# Patient Record
Sex: FEMALE | Race: WHITE | NOT HISPANIC OR LATINO | Employment: FULL TIME | URBAN - METROPOLITAN AREA
[De-identification: names, ages, dates, MRNs, and addresses within clinical notes are randomized per-mention and may not be internally consistent; named-entity substitution may affect disease eponyms.]

---

## 2023-03-11 PROBLEM — F41.9 ANXIETY: Status: ACTIVE | Noted: 2022-10-11

## 2023-03-11 PROBLEM — L40.9 PSORIASIS: Status: ACTIVE | Noted: 2018-10-31

## 2023-03-11 PROBLEM — N94.6 DYSMENORRHEA: Status: ACTIVE | Noted: 2020-07-22

## 2023-03-11 PROBLEM — L70.9 ACNE: Status: ACTIVE | Noted: 2023-03-11

## 2023-03-11 RX ORDER — PAROXETINE HYDROCHLORIDE 20 MG/1
20 TABLET, FILM COATED ORAL
COMMUNITY
End: 2023-11-27 | Stop reason: ALTCHOICE

## 2023-03-11 RX ORDER — IMIQUIMOD 12.5 MG/.25G
CREAM TOPICAL
COMMUNITY
End: 2023-11-27 | Stop reason: ALTCHOICE

## 2023-03-11 RX ORDER — NORETHINDRONE ACETATE AND ETHINYL ESTRADIOL .02; 1 MG/1; MG/1
1 TABLET ORAL DAILY
COMMUNITY
End: 2023-09-09

## 2023-03-11 RX ORDER — ADAPALENE AND BENZOYL PEROXIDE GEL, 0.1%/2.5% 1; 25 MG/G; MG/G
GEL TOPICAL
COMMUNITY
End: 2023-11-27 | Stop reason: ALTCHOICE

## 2023-03-21 VITALS
DIASTOLIC BLOOD PRESSURE: 66 MMHG | WEIGHT: 126 LBS | HEART RATE: 63 BPM | SYSTOLIC BLOOD PRESSURE: 112 MMHG | BODY MASS INDEX: 21.51 KG/M2 | HEIGHT: 64 IN

## 2023-03-21 PROBLEM — F90.9 ADHD: Status: ACTIVE | Noted: 2023-03-21

## 2023-03-24 ENCOUNTER — APPOINTMENT (OUTPATIENT)
Dept: PEDIATRICS | Facility: CLINIC | Age: 23
End: 2023-03-24
Payer: COMMERCIAL

## 2023-05-24 PROBLEM — L70.9 ACNE: Status: RESOLVED | Noted: 2023-03-11 | Resolved: 2023-05-24

## 2023-05-25 ENCOUNTER — OFFICE VISIT (OUTPATIENT)
Dept: PEDIATRICS | Facility: CLINIC | Age: 23
End: 2023-05-25
Payer: COMMERCIAL

## 2023-05-25 VITALS
DIASTOLIC BLOOD PRESSURE: 80 MMHG | HEIGHT: 62 IN | BODY MASS INDEX: 36.95 KG/M2 | SYSTOLIC BLOOD PRESSURE: 131 MMHG | WEIGHT: 200.8 LBS

## 2023-05-25 DIAGNOSIS — F41.9 ANXIETY: Primary | ICD-10-CM

## 2023-05-25 PROCEDURE — 99213 OFFICE O/P EST LOW 20 MIN: CPT | Performed by: PEDIATRICS

## 2023-05-25 RX ORDER — PAROXETINE 30 MG/1
30 TABLET, FILM COATED ORAL EVERY MORNING
Qty: 30 TABLET | Refills: 0 | Status: SHIPPED | OUTPATIENT
Start: 2023-05-25 | End: 2023-11-16

## 2023-05-25 NOTE — PROGRESS NOTES
"Subjective   Patient ID: Simin Molina is a 22 y.o. female who presents for Med Refill    HPI:   - Anxiety - on Paxil 20mg, felt ok after increasing initially.  But recently, has been rough.  Not really helping, still get anxious feelings.  No SE.  Panic attacks at night, crying at night and then will have a hard time sleeping, happening 1-2 times per week.  Little things bother her.  Little motivation, all she wants to do is lay in bed.  Has BF - have been together for the past 5 years, going well.         Review of Systems   All other systems reviewed and are negative.      Objective   Visit Vitals  /80   Ht 1.581 m (5' 2.25\")   Wt 91.1 kg (200 lb 12.8 oz)   BMI 36.43 kg/m²   Smoking Status Unknown   BSA 2 m²     Physical Exam  Vitals reviewed.   Constitutional:       Appearance: Normal appearance.   HENT:      Head: Normocephalic.      Right Ear: External ear normal.      Left Ear: External ear normal.      Nose: Nose normal.      Mouth/Throat:      Mouth: Mucous membranes are moist.   Pulmonary:      Effort: Pulmonary effort is normal.   Skin:     Findings: No rash.   Neurological:      Mental Status: She is alert.       Assessment/Plan   22 y.o. female here with:   - Anxiety/depression - not currently well controlled on Paxil 20.  Will increase Paxil to 30mg daily for the next month, patient to call in 2-3 weeks with update.  If not doing well, will consider switching meds to Lexapro.      Family understands plan and all questions answered.  Discussed all orders from visit and any results received today.  Call or return to office if worsens.    "

## 2023-06-29 ENCOUNTER — TELEPHONE (OUTPATIENT)
Dept: PEDIATRICS | Facility: CLINIC | Age: 23
End: 2023-06-29
Payer: COMMERCIAL

## 2023-06-29 DIAGNOSIS — F41.9 ANXIETY: Primary | ICD-10-CM

## 2023-06-29 RX ORDER — PAROXETINE 30 MG/1
30 TABLET, FILM COATED ORAL EVERY MORNING
Qty: 30 TABLET | Refills: 2 | Status: SHIPPED | OUTPATIENT
Start: 2023-06-29 | End: 2023-11-27 | Stop reason: ALTCHOICE

## 2023-06-29 NOTE — TELEPHONE ENCOUNTER
Called patient, feeling great on Paxil 30mg, no SE.  Will refill x3 months, C in Sept for follow up.  KW

## 2023-09-09 DIAGNOSIS — N94.6 DYSMENORRHEA: Primary | ICD-10-CM

## 2023-09-09 RX ORDER — ESTAZOLAM 2 MG/1
TABLET ORAL
Qty: 21 TABLET | Refills: 2 | Status: SHIPPED | OUTPATIENT
Start: 2023-09-09 | End: 2023-11-13

## 2023-11-07 DIAGNOSIS — N94.6 DYSMENORRHEA: ICD-10-CM

## 2023-11-13 DIAGNOSIS — N94.6 DYSMENORRHEA: Primary | ICD-10-CM

## 2023-11-13 RX ORDER — ESTAZOLAM 2 MG/1
TABLET ORAL
Qty: 21 TABLET | Refills: 0 | Status: SHIPPED | OUTPATIENT
Start: 2023-11-13 | End: 2023-11-13 | Stop reason: SDUPTHER

## 2023-11-13 RX ORDER — NORETHINDRONE ACETATE AND ETHINYL ESTRADIOL .02; 1 MG/1; MG/1
TABLET ORAL
Qty: 21 TABLET | Refills: 0 | Status: SHIPPED | OUTPATIENT
Start: 2023-11-13 | End: 2023-11-27 | Stop reason: SDUPTHER

## 2023-11-14 DIAGNOSIS — F41.9 ANXIETY: ICD-10-CM

## 2023-11-16 RX ORDER — PAROXETINE 30 MG/1
30 TABLET, FILM COATED ORAL
Qty: 30 TABLET | Refills: 0 | Status: SHIPPED | OUTPATIENT
Start: 2023-11-16 | End: 2023-11-27 | Stop reason: ALTCHOICE

## 2023-11-26 NOTE — PROGRESS NOTES
"Simin Molina is a 23 y.o. female here today for well .    Accompanied by: self    Current issues:    - Anxiety - on Paxil 30, has been working \"pretty well.\"  Gets anxious sometimes, but not as bad as before.  Still gets panic attacks a couple times per month.  Still tearful at times.  Not talking with counselor yet.      Nutrition/Elimination/Sleep:   - Diet: well balanced diet (has gained 19# over the past year) and appropriate dairy intake    - Dental: brushes teeth twice daily and regular dental visits (Dr. Butts)   - Elimination: normal bowel movement frequency and consistency   - Menarche/periods: on OCPs (Microgestin), pap recommended (has OB in mind).     - Sleep: sleeps through the night, no problems with sleep, no snoring, still gets anxious before bed.      School and Behavior screening:   - Employment:  working at [employer], not thinking about going back to college for [employer] at this time.            Screening Questions:  Discussed sexual activity (still with same BF for the past 6 years), living at home, but thinking about moving out with BF, alcohol (seltzers on occ) /smoking/vaping/drug use and safety associated with this.     Mood - denies suicidal ideation    Screen time - encouraged less than 2 hours per day.  Physical activity discussed and encouraged.  Goes to the gym 2-3 days per week, has been going for the past couple of months.        Physical Exam  Visit Vitals  /72   Pulse 80   Ht 1.581 m (5' 2.25\")   Wt 96.1 kg (211 lb 12.8 oz)   BMI 38.43 kg/m²   Smoking Status Unknown   BSA 2.05 m²     Physical Exam  Vitals reviewed. Exam conducted with a chaperone present.   Constitutional:       Appearance: Normal appearance.   HENT:      Head: Normocephalic.      Right Ear: Tympanic membrane normal.      Left Ear: Tympanic membrane normal.      Nose: Nose normal.      Mouth/Throat:      Mouth: Mucous membranes are moist.      Pharynx: Oropharynx is clear.   Eyes:      " Extraocular Movements: Extraocular movements intact.      Conjunctiva/sclera: Conjunctivae normal.   Cardiovascular:      Rate and Rhythm: Normal rate and regular rhythm.      Heart sounds: Normal heart sounds.   Pulmonary:      Effort: Pulmonary effort is normal.      Breath sounds: Normal breath sounds.   Abdominal:      General: Abdomen is flat.      Palpations: Abdomen is soft.   Genitourinary:     Comments: Exam deferred  Musculoskeletal:         General: Normal range of motion.      Cervical back: Normal range of motion and neck supple.   Skin:     General: Skin is warm.   Neurological:      General: No focal deficit present.      Mental Status: She is alert.   Psychiatric:         Mood and Affect: Mood normal.       Assessment/Plan  Healthy 23 y.o. female, G/D well.     - Declining flu and COVID vaccine     - Anxiety - list of counselors given.  Will increase Paxil to 40mg daily for the next month, patient to call/message in 2-3 weeks with update.  If doing well, will refill for another 6 months, and med check in May/June.  List of counselors e-mailed to patient (forgot to give at appt).     - Dysmenorrhea - refill Microgestin- for the next year.     - PHQ-9 - 0   - BMI discussed - cont working on healthy eating habits and regular exercise.     - Return in 1 year for next well child exam or earlier with concerns

## 2023-11-27 ENCOUNTER — OFFICE VISIT (OUTPATIENT)
Dept: PEDIATRICS | Facility: CLINIC | Age: 23
End: 2023-11-27
Payer: COMMERCIAL

## 2023-11-27 VITALS
SYSTOLIC BLOOD PRESSURE: 115 MMHG | BODY MASS INDEX: 38.98 KG/M2 | DIASTOLIC BLOOD PRESSURE: 72 MMHG | HEIGHT: 62 IN | HEART RATE: 80 BPM | WEIGHT: 211.8 LBS

## 2023-11-27 DIAGNOSIS — Z00.00 WELL ADULT EXAM: Primary | ICD-10-CM

## 2023-11-27 DIAGNOSIS — F41.9 ANXIETY: ICD-10-CM

## 2023-11-27 DIAGNOSIS — N94.6 DYSMENORRHEA: ICD-10-CM

## 2023-11-27 PROCEDURE — 99213 OFFICE O/P EST LOW 20 MIN: CPT | Performed by: PEDIATRICS

## 2023-11-27 PROCEDURE — 99395 PREV VISIT EST AGE 18-39: CPT | Performed by: PEDIATRICS

## 2023-11-27 RX ORDER — NORETHINDRONE ACETATE AND ETHINYL ESTRADIOL .02; 1 MG/1; MG/1
TABLET ORAL
Qty: 21 TABLET | Refills: 12 | Status: SHIPPED | OUTPATIENT
Start: 2023-11-27

## 2023-11-27 RX ORDER — PAROXETINE HYDROCHLORIDE 40 MG/1
40 TABLET, FILM COATED ORAL EVERY MORNING
Qty: 30 TABLET | Refills: 0 | Status: SHIPPED | OUTPATIENT
Start: 2023-11-27 | End: 2024-02-01 | Stop reason: SDUPTHER

## 2024-02-01 ENCOUNTER — APPOINTMENT (OUTPATIENT)
Dept: PEDIATRICS | Facility: CLINIC | Age: 24
End: 2024-02-01
Payer: COMMERCIAL

## 2024-02-01 DIAGNOSIS — F41.9 ANXIETY: Primary | ICD-10-CM

## 2024-02-01 RX ORDER — PAROXETINE HYDROCHLORIDE 40 MG/1
40 TABLET, FILM COATED ORAL EVERY MORNING
Qty: 30 TABLET | Refills: 4 | Status: SHIPPED | OUTPATIENT
Start: 2024-02-01

## 2024-02-01 NOTE — PROGRESS NOTES
Subjective   Patient ID: Simin Molina is a 23 y.o. female who presents for No chief complaint on file.    HPI:      Review of Systems   All other systems reviewed and are negative.      Objective   Visit Vitals  Smoking Status Unknown     Physical Exam  Vitals reviewed.   Constitutional:       Appearance: Normal appearance.   HENT:      Head: Normocephalic.      Right Ear: External ear normal.      Left Ear: External ear normal.      Nose: Nose normal.      Mouth/Throat:      Mouth: Mucous membranes are moist.   Pulmonary:      Effort: Pulmonary effort is normal.   Skin:     Findings: No rash.   Neurological:      Mental Status: She is alert.       Assessment/Plan   23 y.o. female here with:      Family understands plan and all questions answered.  Discussed all orders from visit and any results received today.  Call or return to office if worsens.

## 2024-02-01 NOTE — PROGRESS NOTES
Patient called stating Paxil 40 working well.  Will refill for another 5 months.  Med check before running out.  KW

## 2024-04-01 ENCOUNTER — OFFICE VISIT (OUTPATIENT)
Dept: PEDIATRICS | Facility: CLINIC | Age: 24
End: 2024-04-01
Payer: COMMERCIAL

## 2024-04-01 VITALS — TEMPERATURE: 98 F | WEIGHT: 203 LBS | HEART RATE: 71 BPM | BODY MASS INDEX: 36.83 KG/M2 | OXYGEN SATURATION: 98 %

## 2024-04-01 DIAGNOSIS — J18.9 PNEUMONIA DUE TO INFECTIOUS ORGANISM, UNSPECIFIED LATERALITY, UNSPECIFIED PART OF LUNG: Primary | ICD-10-CM

## 2024-04-01 PROCEDURE — 99214 OFFICE O/P EST MOD 30 MIN: CPT | Performed by: PEDIATRICS

## 2024-04-01 RX ORDER — AZITHROMYCIN 250 MG/1
TABLET, FILM COATED ORAL
Qty: 6 TABLET | Refills: 0 | Status: SHIPPED | OUTPATIENT
Start: 2024-04-01 | End: 2024-04-06

## 2024-04-01 RX ORDER — GUAIFENESIN 600 MG/1
600 TABLET, EXTENDED RELEASE ORAL 2 TIMES DAILY
COMMUNITY
Start: 2024-03-25 | End: 2024-06-23

## 2024-04-01 RX ORDER — BROMPHENIRAMINE MALEATE, PSEUDOEPHEDRINE HYDROCHLORIDE, AND DEXTROMETHORPHAN HYDROBROMIDE 2; 30; 10 MG/5ML; MG/5ML; MG/5ML
10 SYRUP ORAL
COMMUNITY
Start: 2024-03-25

## 2024-04-01 RX ORDER — CEFDINIR 300 MG/1
300 CAPSULE ORAL 2 TIMES DAILY
COMMUNITY
Start: 2024-03-25 | End: 2024-04-04

## 2024-04-01 NOTE — PROGRESS NOTES
Subjective   Patient ID: Simin Molina is a 23 y.o. female who presents for Sinusitis (Seen at urgent care dx with sinus infection, no improvement.)    HPI  Sick x 3 weeks  Dx sinusitis 3/25 in urgent care  Cefdinir and guafenecin  Was 'nasally' earlier  Now feels as if is in her chest  Fever last week and then Saturday  Cough is much worse  Chest discomfort    Is a   No abd pain  No vomiting    No Rash      Visit Vitals  Pulse 71   Temp 36.7 °C (98 °F) (Tympanic)      Objective   Physical Exam  Constitutional:       General: She is not in acute distress.     Appearance: Normal appearance.   HENT:      Head: Atraumatic.      Right Ear: Tympanic membrane and ear canal normal.      Left Ear: Tympanic membrane and ear canal normal.      Nose: Congestion present.      Mouth/Throat:      Mouth: Mucous membranes are moist.   Eyes:      General:         Right eye: No discharge.         Left eye: No discharge.      Conjunctiva/sclera: Conjunctivae normal.      Pupils: Pupils are equal, round, and reactive to light.   Cardiovascular:      Rate and Rhythm: Normal rate and regular rhythm.      Heart sounds: No murmur heard.  Pulmonary:      Effort: Pulmonary effort is normal. No respiratory distress.      Breath sounds: Rales (scattered b/l) present.   Abdominal:      General: There is no distension.      Palpations: Abdomen is soft. There is no mass.      Tenderness: There is no abdominal tenderness.   Musculoskeletal:      Cervical back: Neck supple.   Lymphadenopathy:      Cervical: No cervical adenopathy.   Skin:     Findings: No rash.   Neurological:      Mental Status: She is alert.         Reviewed the following with parent/patient prior to end of visit:  YES - Supportive Care / Observation  YES - Acetaminophen / Ibuprofen as needed  YES - Monitor PO fluid intake and urine output  YES - Call or return to office if worsens  YES - Family understands plan and all questions answered  YES - Discussed all  orders from visit and any results received today.  NO - Family instructed to call in 1-2 days after test to obtain results    Assessment/Plan   Diagnoses and all orders for this visit:  Pneumonia due to infectious organism, unspecified laterality, unspecified part of lung  -     azithromycin (Zithromax) 250 mg tablet; Take 2 tablets (500 mg) by mouth once daily for 1 day, THEN 1 tablet (250 mg) once daily for 4 days.  Supportive care  Ct cefdinir till course complete (2.5 days left)  Cool mist humidifier  Hydration  Fever control  Honey  Indications to call/ come in discussed  Call/ come in if no better in 2 days or if worse at any time   Diagnoses and all orders for this visit:  Pneumonia due to infectious organism, unspecified laterality, unspecified part of lung  -     azithromycin (Zithromax) 250 mg tablet; Take 2 tablets (500 mg) by mouth once daily for 1 day, THEN 1 tablet (250 mg) once daily for 4 days.

## 2024-11-29 DIAGNOSIS — N94.6 DYSMENORRHEA: ICD-10-CM

## 2024-11-29 RX ORDER — NORETHINDRONE ACETATE AND ETHINYL ESTRADIOL .02; 1 MG/1; MG/1
TABLET ORAL
Qty: 21 TABLET | Refills: 0 | OUTPATIENT
Start: 2024-11-29

## 2024-12-12 DIAGNOSIS — N94.6 DYSMENORRHEA: ICD-10-CM

## 2024-12-13 ENCOUNTER — TELEPHONE (OUTPATIENT)
Dept: PEDIATRICS | Facility: CLINIC | Age: 24
End: 2024-12-13
Payer: COMMERCIAL

## 2024-12-13 RX ORDER — NORETHINDRONE ACETATE AND ETHINYL ESTRADIOL .02; 1 MG/1; MG/1
TABLET ORAL
Qty: 21 TABLET | Refills: 0 | OUTPATIENT
Start: 2024-12-13

## 2024-12-16 ENCOUNTER — APPOINTMENT (OUTPATIENT)
Dept: PEDIATRICS | Facility: CLINIC | Age: 24
End: 2024-12-16
Payer: COMMERCIAL

## 2024-12-16 VITALS
BODY MASS INDEX: 36.36 KG/M2 | HEIGHT: 62 IN | WEIGHT: 197.6 LBS | HEART RATE: 87 BPM | SYSTOLIC BLOOD PRESSURE: 120 MMHG | DIASTOLIC BLOOD PRESSURE: 85 MMHG

## 2024-12-16 DIAGNOSIS — Z00.121 ENCOUNTER FOR ROUTINE CHILD HEALTH EXAMINATION WITH ABNORMAL FINDINGS: Primary | ICD-10-CM

## 2024-12-16 DIAGNOSIS — N94.6 DYSMENORRHEA: ICD-10-CM

## 2024-12-16 DIAGNOSIS — F41.9 ANXIETY: ICD-10-CM

## 2024-12-16 RX ORDER — PAROXETINE HYDROCHLORIDE 40 MG/1
40 TABLET, FILM COATED ORAL EVERY MORNING
Qty: 30 TABLET | Refills: 11 | Status: SHIPPED | OUTPATIENT
Start: 2024-12-16

## 2024-12-16 RX ORDER — NORETHINDRONE ACETATE AND ETHINYL ESTRADIOL .02; 1 MG/1; MG/1
TABLET ORAL
Qty: 21 TABLET | Refills: 12 | Status: SHIPPED | OUTPATIENT
Start: 2024-12-16

## 2024-12-16 NOTE — PROGRESS NOTES
"Subjective   Patient ID: Simin Molina is a 24 y.o. female who presents for Well Child (Here for [facility]).  HPI    Pt here with:      20+ year female checkup    No problems with Paxil or OC.  Needs refill.  Ran out of Paxil, feeling more anxiety.    Diet and Nutrition:  ?  Dietary: well balanced diet, appropriate Calcium intake.  Sleep:  ?  Sleep: No problems with sleep.  Elimination:  ?  Elimination: normal bowel movement frequency, normal consistency.  Genitourinary:  ?  Female Genitourinary: No problems with periods.  School/Behavior:  ?  Teaches pre-K.  ?  Exercise: gets regular exercise, physical activity level discussed and encouraged.    No Vaping, no smoking, no alcohol, no drugs, sexually active, uses protection.    Visit Vitals  /85   Pulse 87   Ht 1.575 m (5' 2\")   Wt 89.6 kg (197 lb 9.6 oz)   BMI 36.14 kg/m²   Smoking Status Unknown   BSA 1.98 m²     Objective   Physical Exam  Vitals reviewed.   Constitutional:       General: She is not in acute distress.     Appearance: She is not toxic-appearing.   HENT:      Right Ear: Tympanic membrane and ear canal normal.      Left Ear: Tympanic membrane and ear canal normal.      Nose: Nose normal. No congestion or rhinorrhea.      Mouth/Throat:      Mouth: Mucous membranes are moist.      Pharynx: No oropharyngeal exudate or posterior oropharyngeal erythema.   Eyes:      Conjunctiva/sclera: Conjunctivae normal.   Cardiovascular:      Rate and Rhythm: Normal rate and regular rhythm.      Heart sounds: Normal heart sounds. No murmur heard.  Pulmonary:      Effort: No respiratory distress or retractions.      Breath sounds: Normal breath sounds. No stridor or decreased air movement. No wheezing or rhonchi.   Abdominal:      Palpations: Abdomen is soft. There is no hepatomegaly, splenomegaly or mass.      Tenderness: There is no abdominal tenderness.   Genitourinary:     Comments: Declined  exam.  Musculoskeletal:         General: No signs of injury.      " Thoracic back: No scoliosis.      Lumbar back: No scoliosis.   Skin:     Findings: No rash.   Neurological:      Mental Status: She is alert.      Sensory: Sensation is intact.      Motor: Motor function is intact.      Gait: Gait is intact.   Psychiatric:         Mood and Affect: Mood normal.         NO - Family instructed to call __ days after going for test to obtain results  YES - OK for school and sports  YES - Family declined all or some vaccines - Tdap  YES - All vaccines given at today's visit were reviewed with the family and patient. Risks/benefits/side effects discussed and VIS sheet provided. All questions answered. Given with consent    A/P:  Well young adult.  Depression Screen normal.  Anxiety screen 23.  Currently out of Paxil.  Will restart.  If not significantly improved in a few weeks, Simin to follow-up.  Otherwise, refilled for 1 year.    Periods stable on OC.  Refilled for 1 year.    BMI reviewed.    F/U:  1 year  Discussed all orders from visit and any results received today.    Assessment/Plan   {Assess/PlanSmartLinks:4096    1. Encounter for routine child health examination with abnormal findings    2. Dysmenorrhea    3. Anxiety        No problem-specific Assessment & Plan notes found for this encounter.      Problem List Items Addressed This Visit       Anxiety    Overview     Paxil 10mg started Sept '22, increased to 20mg in Oct '22, increased to 30mg May '23, increased to 40mg Nov '23         Relevant Medications    PARoxetine (PaxiL) 40 mg tablet    Dysmenorrhea    Overview     On OCPs         Relevant Medications    norethindrone ac-eth estradioL (Microgestin 1/20, 21,) 1-20 mg-mcg tablet     Other Visit Diagnoses       Encounter for routine child health examination with abnormal findings    -  Primary    Relevant Orders    1 Year Follow Up In Pediatrics         0